# Patient Record
Sex: MALE | Race: WHITE | URBAN - METROPOLITAN AREA
[De-identification: names, ages, dates, MRNs, and addresses within clinical notes are randomized per-mention and may not be internally consistent; named-entity substitution may affect disease eponyms.]

---

## 2018-07-29 ENCOUNTER — ED HISTORICAL/CONVERTED ENCOUNTER (OUTPATIENT)
Dept: OTHER | Age: 23
End: 2018-07-29

## 2024-06-25 ENCOUNTER — OFFICE VISIT (OUTPATIENT)
Age: 29
End: 2024-06-25

## 2024-06-25 VITALS
TEMPERATURE: 99.2 F | HEART RATE: 72 BPM | OXYGEN SATURATION: 98 % | BODY MASS INDEX: 26.37 KG/M2 | HEIGHT: 73 IN | WEIGHT: 199 LBS | SYSTOLIC BLOOD PRESSURE: 103 MMHG | DIASTOLIC BLOOD PRESSURE: 62 MMHG | RESPIRATION RATE: 15 BRPM

## 2024-06-25 DIAGNOSIS — R10.32 LEFT GROIN PAIN: Primary | ICD-10-CM

## 2024-06-25 ASSESSMENT — ENCOUNTER SYMPTOMS
ABDOMINAL PAIN: 1
NAUSEA: 0
VOMITING: 0
COUGH: 0

## 2024-06-25 NOTE — PROGRESS NOTES
Perdo Clarke is a 28 y.o. male who is referred by [facility], [facility] for further evaluation of left groin pain and a possible LIH.     Mr. Clarke tells me that he slipped at work several weeks ago and subsequently began experiencing a \"burning sensation\" in his left groin. No visible left groin bulge. No associated nausea or vomiting. No h/o chronic cough or constipation. No right groin discomfort or visible right groin bulge.   He has otherwise been in his usual state of health.     Past Medical History:   Diagnosis Date    Left groin pain 06/25/2024     History reviewed. No pertinent surgical history.    History reviewed. No pertinent family history.    Social History     Socioeconomic History    Marital status: Single     Spouse name: None    Number of children: None    Years of education: None    Highest education level: None     Review of systems negative except as noted.    Review of Systems   Respiratory:  Negative for cough.    Gastrointestinal:  Positive for abdominal pain (Left groin discomfort). Negative for nausea and vomiting.     Physical Exam  Vitals reviewed.   Constitutional:       General: He is not in acute distress.     Appearance: Normal appearance. He is normal weight.   HENT:      Head: Normocephalic and atraumatic.   Eyes:      General: No scleral icterus.  Cardiovascular:      Rate and Rhythm: Normal rate and regular rhythm.   Pulmonary:      Effort: Pulmonary effort is normal.      Breath sounds: Normal breath sounds.   Abdominal:      General: There is no distension.      Palpations: Abdomen is soft.      Tenderness: There is no abdominal tenderness. There is no guarding or rebound.      Hernia: No hernia is present. There is no hernia in the left inguinal area or right inguinal area.      Comments: No apparent right or left inguinal hernia.   Musculoskeletal:         General: Normal range of motion.      Cervical back: Neck supple.   Lymphadenopathy:      Cervical: No cervical

## 2024-07-08 ENCOUNTER — CLINICAL DOCUMENTATION (OUTPATIENT)
Age: 29
End: 2024-07-08

## 2024-07-08 NOTE — PROGRESS NOTES
Called Marily with [employer] for WC claim. Left a message for a return call to provide information for claim and billing info.       Marily @ [employer] [phone]